# Patient Record
Sex: FEMALE | Race: WHITE | NOT HISPANIC OR LATINO
[De-identification: names, ages, dates, MRNs, and addresses within clinical notes are randomized per-mention and may not be internally consistent; named-entity substitution may affect disease eponyms.]

---

## 2024-04-26 ENCOUNTER — APPOINTMENT (OUTPATIENT)
Dept: ANTEPARTUM | Facility: CLINIC | Age: 19
End: 2024-04-26

## 2024-05-10 ENCOUNTER — ASOB RESULT (OUTPATIENT)
Age: 19
End: 2024-05-10

## 2024-05-10 ENCOUNTER — APPOINTMENT (OUTPATIENT)
Dept: ANTEPARTUM | Facility: CLINIC | Age: 19
End: 2024-05-10
Payer: COMMERCIAL

## 2024-05-10 PROCEDURE — 76811 OB US DETAILED SNGL FETUS: CPT

## 2024-07-08 ENCOUNTER — APPOINTMENT (OUTPATIENT)
Dept: ANTEPARTUM | Facility: CLINIC | Age: 19
End: 2024-07-08

## 2024-07-12 ENCOUNTER — ASOB RESULT (OUTPATIENT)
Age: 19
End: 2024-07-12

## 2024-07-12 ENCOUNTER — APPOINTMENT (OUTPATIENT)
Dept: ANTEPARTUM | Facility: CLINIC | Age: 19
End: 2024-07-12

## 2024-07-12 PROCEDURE — 76817 TRANSVAGINAL US OBSTETRIC: CPT

## 2024-07-12 PROCEDURE — 76816 OB US FOLLOW-UP PER FETUS: CPT

## 2024-08-29 ENCOUNTER — INPATIENT (INPATIENT)
Facility: HOSPITAL | Age: 19
LOS: 2 days | Discharge: HOME | DRG: 951 | End: 2024-09-01
Attending: OBSTETRICS & GYNECOLOGY | Admitting: OBSTETRICS & GYNECOLOGY
Payer: COMMERCIAL

## 2024-08-29 VITALS — SYSTOLIC BLOOD PRESSURE: 113 MMHG | DIASTOLIC BLOOD PRESSURE: 66 MMHG

## 2024-08-29 DIAGNOSIS — O26.899 OTHER SPECIFIED PREGNANCY RELATED CONDITIONS, UNSPECIFIED TRIMESTER: ICD-10-CM

## 2024-08-29 DIAGNOSIS — Z34.80 ENCOUNTER FOR SUPERVISION OF OTHER NORMAL PREGNANCY, UNSPECIFIED TRIMESTER: ICD-10-CM

## 2024-08-29 DIAGNOSIS — O42.90 PREMATURE RUPTURE OF MEMBRANES, UNSPECIFIED AS TO LENGTH OF TIME BETWEEN RUPTURE AND ONSET OF LABOR, UNSPECIFIED WEEKS OF GESTATION: ICD-10-CM

## 2024-08-29 LAB
BASOPHILS # BLD AUTO: 0.02 K/UL — SIGNIFICANT CHANGE UP (ref 0–0.2)
BASOPHILS NFR BLD AUTO: 0.2 % — SIGNIFICANT CHANGE UP (ref 0–2)
BLD GP AB SCN SERPL QL: NEGATIVE — SIGNIFICANT CHANGE UP
EOSINOPHIL # BLD AUTO: 0.03 K/UL — SIGNIFICANT CHANGE UP (ref 0–0.5)
EOSINOPHIL NFR BLD AUTO: 0.4 % — SIGNIFICANT CHANGE UP (ref 0–6)
HCT VFR BLD CALC: 38.9 % — SIGNIFICANT CHANGE UP (ref 34.5–45)
HGB BLD-MCNC: 13 G/DL — SIGNIFICANT CHANGE UP (ref 11.5–15.5)
IMM GRANULOCYTES NFR BLD AUTO: 0.9 % — SIGNIFICANT CHANGE UP (ref 0–0.9)
LYMPHOCYTES # BLD AUTO: 1.95 K/UL — SIGNIFICANT CHANGE UP (ref 1–3.3)
LYMPHOCYTES # BLD AUTO: 23 % — SIGNIFICANT CHANGE UP (ref 13–44)
MCHC RBC-ENTMCNC: 28.4 PG — SIGNIFICANT CHANGE UP (ref 27–34)
MCHC RBC-ENTMCNC: 33.4 GM/DL — SIGNIFICANT CHANGE UP (ref 32–36)
MCV RBC AUTO: 84.9 FL — SIGNIFICANT CHANGE UP (ref 80–100)
MONOCYTES # BLD AUTO: 0.47 K/UL — SIGNIFICANT CHANGE UP (ref 0–0.9)
MONOCYTES NFR BLD AUTO: 5.5 % — SIGNIFICANT CHANGE UP (ref 2–14)
NEUTROPHILS # BLD AUTO: 5.93 K/UL — SIGNIFICANT CHANGE UP (ref 1.8–7.4)
NEUTROPHILS NFR BLD AUTO: 70 % — SIGNIFICANT CHANGE UP (ref 43–77)
NRBC # BLD: 0 /100 WBCS — SIGNIFICANT CHANGE UP (ref 0–0)
PLATELET # BLD AUTO: 160 K/UL — SIGNIFICANT CHANGE UP (ref 150–400)
RBC # BLD: 4.58 M/UL — SIGNIFICANT CHANGE UP (ref 3.8–5.2)
RBC # FLD: 14.7 % — HIGH (ref 10.3–14.5)
RH IG SCN BLD-IMP: NEGATIVE — SIGNIFICANT CHANGE UP
RH IG SCN BLD-IMP: NEGATIVE — SIGNIFICANT CHANGE UP
WBC # BLD: 8.48 K/UL — SIGNIFICANT CHANGE UP (ref 3.8–10.5)
WBC # FLD AUTO: 8.48 K/UL — SIGNIFICANT CHANGE UP (ref 3.8–10.5)

## 2024-08-29 RX ORDER — CHLORHEXIDINE GLUCONATE 40 MG/ML
1 SOLUTION TOPICAL DAILY
Refills: 0 | Status: DISCONTINUED | OUTPATIENT
Start: 2024-08-29 | End: 2024-08-30

## 2024-08-29 RX ORDER — OXYTOCIN 10 UNIT/ML
333.33 AMPUL (ML) INJECTION
Qty: 20 | Refills: 0 | Status: DISCONTINUED | OUTPATIENT
Start: 2024-08-29 | End: 2024-08-30

## 2024-08-29 RX ORDER — SODIUM CITRATE AND CITRIC ACID MONOHYDRATE 334; 500 MG/5ML; MG/5ML
15 SOLUTION ORAL EVERY 6 HOURS
Refills: 0 | Status: DISCONTINUED | OUTPATIENT
Start: 2024-08-29 | End: 2024-08-30

## 2024-08-29 RX ADMIN — Medication 125 MILLILITER(S): at 18:26

## 2024-08-29 NOTE — OB PROVIDER H&P - PROBLEM SELECTOR PLAN 1
Admit  Routine labs  IV access and IV fluids  Bicitra  Cont/efm  For buccal cytotec and cervical 30cc balloon  Anesthesia consult  Expectant .  d/w Dr. Acharya.  KATIE Augustine

## 2024-08-29 NOTE — OB PROVIDER H&P - NSHPPHYSICALEXAM_GEN_ALL_CORE
Vital Signs Last 24 Hrs  T(C): 37.2 (29 Aug 2024 17:21), Max: 37.2 (29 Aug 2024 17:21)  T(F): 99 (29 Aug 2024 17:21), Max: 99 (29 Aug 2024 17:21)  HR: 90 (29 Aug 2024 17:44) (89 - 102)  BP: 113/66 (29 Aug 2024 17:21) (113/66 - 113/66)  BP(mean): --  RR: 18 (29 Aug 2024 17:21) (18 - 18)  SpO2: 93% (29 Aug 2024 17:44) (93% - 96%)    Parameters below as of 29 Aug 2024 17:21  Patient On (Oxygen Delivery Method): room air    gen: NAD  abd: soft, gravid  ve: 0.5/50/-3 posterior/mod tone, grossly ROM Clear fluid    NST: reactive  toco: no ctx

## 2024-08-29 NOTE — OB PROVIDER H&P - NSLOWPPHRISK_OBGYN_A_OB
No previous uterine incision/Umanzor Pregnancy/Less than or equal to 4 previous vaginal births/No known bleeding disorder/No history of postpartum hemorrhage/No other PPH risks indicated

## 2024-08-29 NOTE — OB PROVIDER H&P - HISTORY OF PRESENT ILLNESS
20 y/o  ELSY 24 @ 38.5 wks ga presenting w/ gush of clear fluid at 6:30 am with continued leaking of fluid and cramping 5/10. +FM. Denies vb. GBS neg.  EFW 7lb. PNC uncomplicated. VE yesterday was closed.    all: nkda  meds: pnv    pmhx: denies  ob: current  gyn: denies  surg: denies  fhx: noncontributory  soc: denies x 3.

## 2024-08-29 NOTE — OB RN PATIENT PROFILE - FUNCTIONAL ASSESSMENT - DAILY ACTIVITY SCORE.
Attempted to discuss results with patient's mother, no answer.  Left voicemail and encouraged them to call back to discuss results.  Negative Covid results at this time.   declines 24

## 2024-08-29 NOTE — OB PROVIDER H&P - NS_FETALPRESENTATIONA_OBGYN_ALL_OB
Patient had VSS, denied any chest pain. Patient was SALAZAR on 6L of 02, sats in the high 80's to low 90's. Applied oxymizer on at 10L of O2, sats increased to 96%. Patient's breathing was not labored at that time. LS are diminished. Up to chair for dinner, fair appetite. Up to bathroom several times after IV lasix. Good output. Changed back to NC per patient request. Patient on 6L of O2, sats are 91% at rest. Patient seems comfortable at this time. Continue to monitor.    Cephalic

## 2024-08-30 ENCOUNTER — TRANSCRIPTION ENCOUNTER (OUTPATIENT)
Age: 19
End: 2024-08-30

## 2024-08-30 LAB — T PALLIDUM AB TITR SER: NEGATIVE — SIGNIFICANT CHANGE UP

## 2024-08-30 RX ORDER — OXYTOCIN 10 UNIT/ML
10 AMPUL (ML) INJECTION ONCE
Refills: 0 | Status: COMPLETED | OUTPATIENT
Start: 2024-08-30 | End: 2024-08-30

## 2024-08-30 RX ORDER — SODIUM CHLORIDE 9 MG/ML
3 INJECTION INTRAMUSCULAR; INTRAVENOUS; SUBCUTANEOUS EVERY 8 HOURS
Refills: 0 | Status: DISCONTINUED | OUTPATIENT
Start: 2024-08-30 | End: 2024-09-01

## 2024-08-30 RX ORDER — TERBUTALINE SULFATE 1 MG/ML
0.25 VIAL (ML) SUBCUTANEOUS ONCE
Refills: 0 | Status: DISCONTINUED | OUTPATIENT
Start: 2024-08-30 | End: 2024-08-30

## 2024-08-30 RX ORDER — WITCH HAZEL 1 G/ML
1 LIQUID TOPICAL EVERY 4 HOURS
Refills: 0 | Status: DISCONTINUED | OUTPATIENT
Start: 2024-08-30 | End: 2024-09-01

## 2024-08-30 RX ORDER — OXYTOCIN 10 UNIT/ML
41.67 AMPUL (ML) INJECTION
Qty: 20 | Refills: 0 | Status: DISCONTINUED | OUTPATIENT
Start: 2024-08-30 | End: 2024-09-01

## 2024-08-30 RX ORDER — MENTHOL/CETYLPYRD CL 3 MG
1 LOZENGE MUCOUS MEMBRANE EVERY 6 HOURS
Refills: 0 | Status: DISCONTINUED | OUTPATIENT
Start: 2024-08-30 | End: 2024-09-01

## 2024-08-30 RX ORDER — TETANUS TOXOID, REDUCED DIPHTHERIA TOXOID AND ACELLULAR PERTUSSIS VACCINE, ADSORBED 5; 2.5; 8; 8; 2.5 [IU]/.5ML; [IU]/.5ML; UG/.5ML; UG/.5ML; UG/.5ML
0.5 SUSPENSION INTRAMUSCULAR ONCE
Refills: 0 | Status: DISCONTINUED | OUTPATIENT
Start: 2024-08-30 | End: 2024-09-01

## 2024-08-30 RX ORDER — ACETAMINOPHEN 325 MG/1
975 TABLET ORAL
Refills: 0 | Status: DISCONTINUED | OUTPATIENT
Start: 2024-08-30 | End: 2024-09-01

## 2024-08-30 RX ORDER — OXYCODONE HYDROCHLORIDE 5 MG/1
5 TABLET ORAL
Refills: 0 | Status: DISCONTINUED | OUTPATIENT
Start: 2024-08-30 | End: 2024-09-01

## 2024-08-30 RX ORDER — METHYLERGONOVINE MALEATE 0.2 MG/1
0.2 TABLET ORAL ONCE
Refills: 0 | Status: COMPLETED | OUTPATIENT
Start: 2024-08-30 | End: 2024-08-30

## 2024-08-30 RX ORDER — LANOLIN
1 OINTMENT (GRAM) TOPICAL EVERY 6 HOURS
Refills: 0 | Status: DISCONTINUED | OUTPATIENT
Start: 2024-08-30 | End: 2024-09-01

## 2024-08-30 RX ORDER — KETOROLAC TROMETHAMINE 30 MG/ML
30 INJECTION, SOLUTION INTRAMUSCULAR ONCE
Refills: 0 | Status: DISCONTINUED | OUTPATIENT
Start: 2024-08-30 | End: 2024-08-30

## 2024-08-30 RX ORDER — DIPHENHYDRAMINE HCL 50 MG
25 CAPSULE ORAL EVERY 6 HOURS
Refills: 0 | Status: DISCONTINUED | OUTPATIENT
Start: 2024-08-30 | End: 2024-09-01

## 2024-08-30 RX ORDER — IBUPROFEN 600 MG
600 TABLET ORAL EVERY 6 HOURS
Refills: 0 | Status: COMPLETED | OUTPATIENT
Start: 2024-08-30 | End: 2025-07-29

## 2024-08-30 RX ORDER — HYDROCORTISONE 1 %
1 OINTMENT (GRAM) TOPICAL EVERY 6 HOURS
Refills: 0 | Status: DISCONTINUED | OUTPATIENT
Start: 2024-08-30 | End: 2024-09-01

## 2024-08-30 RX ORDER — PRAMOXINE HCL 1 %
1 GEL (GRAM) TOPICAL EVERY 4 HOURS
Refills: 0 | Status: DISCONTINUED | OUTPATIENT
Start: 2024-08-30 | End: 2024-09-01

## 2024-08-30 RX ORDER — IBUPROFEN 600 MG
600 TABLET ORAL EVERY 6 HOURS
Refills: 0 | Status: DISCONTINUED | OUTPATIENT
Start: 2024-08-30 | End: 2024-09-01

## 2024-08-30 RX ORDER — SODIUM CHLORIDE 9 MG/ML
300 INJECTION INTRAMUSCULAR; INTRAVENOUS; SUBCUTANEOUS ONCE
Refills: 0 | Status: DISCONTINUED | OUTPATIENT
Start: 2024-08-30 | End: 2024-08-30

## 2024-08-30 RX ORDER — VITAMIN A, ASCORBIC ACID, VITAMIN D, .ALPHA.-TOCOPHEROL, THIAMINE MONONITRATE, RIBOFLAVIN, NIACIN, PYRIDOXINE HYDROCHLORIDE, FOLIC ACID, CYANOCOBALAMIN, CALCIUM, IRON, MAGNESIUM, ZINC, AND COPPER 2700; 70; 400; 30; 1.6; 1.8; 18; 2.5; 1; 12; 100; 65; 25; 25; 2 [IU]/1; MG/1; [IU]/1; [IU]/1; MG/1; MG/1; MG/1; MG/1; MG/1; UG/1; MG/1; MG/1; MG/1; MG/1; MG/1
1 TABLET ORAL DAILY
Refills: 0 | Status: DISCONTINUED | OUTPATIENT
Start: 2024-08-30 | End: 2024-09-01

## 2024-08-30 RX ORDER — SODIUM CHLORIDE 9 MG/ML
1000 INJECTION INTRAMUSCULAR; INTRAVENOUS; SUBCUTANEOUS
Refills: 0 | Status: DISCONTINUED | OUTPATIENT
Start: 2024-08-30 | End: 2024-08-30

## 2024-08-30 RX ORDER — OXYCODONE HYDROCHLORIDE 5 MG/1
5 TABLET ORAL ONCE
Refills: 0 | Status: DISCONTINUED | OUTPATIENT
Start: 2024-08-30 | End: 2024-09-01

## 2024-08-30 RX ORDER — OXYTOCIN 10 UNIT/ML
4 AMPUL (ML) INJECTION
Qty: 30 | Refills: 0 | Status: DISCONTINUED | OUTPATIENT
Start: 2024-08-30 | End: 2024-08-30

## 2024-08-30 RX ORDER — ACETAMINOPHEN 325 MG/1
3 TABLET ORAL
Qty: 0 | Refills: 0 | DISCHARGE
Start: 2024-08-30

## 2024-08-30 RX ADMIN — Medication 10 UNIT(S): at 06:51

## 2024-08-30 RX ADMIN — OXYCODONE HYDROCHLORIDE 5 MILLIGRAM(S): 5 TABLET ORAL at 17:34

## 2024-08-30 RX ADMIN — VITAMIN A, ASCORBIC ACID, VITAMIN D, .ALPHA.-TOCOPHEROL, THIAMINE MONONITRATE, RIBOFLAVIN, NIACIN, PYRIDOXINE HYDROCHLORIDE, FOLIC ACID, CYANOCOBALAMIN, CALCIUM, IRON, MAGNESIUM, ZINC, AND COPPER 1 TABLET(S): 2700; 70; 400; 30; 1.6; 1.8; 18; 2.5; 1; 12; 100; 65; 25; 25; 2 TABLET ORAL at 14:10

## 2024-08-30 RX ADMIN — SODIUM CHLORIDE 3 MILLILITER(S): 9 INJECTION INTRAMUSCULAR; INTRAVENOUS; SUBCUTANEOUS at 21:30

## 2024-08-30 RX ADMIN — Medication 4 MILLIUNIT(S)/MIN: at 05:43

## 2024-08-30 RX ADMIN — Medication 600 MILLIGRAM(S): at 14:09

## 2024-08-30 RX ADMIN — KETOROLAC TROMETHAMINE 30 MILLIGRAM(S): 30 INJECTION, SOLUTION INTRAMUSCULAR at 07:43

## 2024-08-30 RX ADMIN — SODIUM CHLORIDE 3 MILLILITER(S): 9 INJECTION INTRAMUSCULAR; INTRAVENOUS; SUBCUTANEOUS at 14:00

## 2024-08-30 RX ADMIN — OXYCODONE HYDROCHLORIDE 5 MILLIGRAM(S): 5 TABLET ORAL at 17:59

## 2024-08-30 RX ADMIN — METHYLERGONOVINE MALEATE 0.2 MILLIGRAM(S): 0.2 TABLET ORAL at 06:56

## 2024-08-30 RX ADMIN — ACETAMINOPHEN 975 MILLIGRAM(S): 325 TABLET ORAL at 11:36

## 2024-08-30 RX ADMIN — ACETAMINOPHEN 975 MILLIGRAM(S): 325 TABLET ORAL at 17:29

## 2024-08-30 RX ADMIN — Medication 600 MILLIGRAM(S): at 20:16

## 2024-08-30 RX ADMIN — ACETAMINOPHEN 975 MILLIGRAM(S): 325 TABLET ORAL at 11:06

## 2024-08-30 RX ADMIN — ACETAMINOPHEN 975 MILLIGRAM(S): 325 TABLET ORAL at 17:59

## 2024-08-30 RX ADMIN — Medication 600 MILLIGRAM(S): at 20:55

## 2024-08-30 RX ADMIN — OXYCODONE HYDROCHLORIDE 5 MILLIGRAM(S): 5 TABLET ORAL at 21:30

## 2024-08-30 RX ADMIN — OXYCODONE HYDROCHLORIDE 5 MILLIGRAM(S): 5 TABLET ORAL at 20:54

## 2024-08-30 RX ADMIN — Medication 220 MILLIGRAM(S): at 06:58

## 2024-08-30 RX ADMIN — Medication 30 MILLILITER(S): at 17:34

## 2024-08-30 RX ADMIN — Medication 600 MILLIGRAM(S): at 14:39

## 2024-08-30 NOTE — OB PROVIDER DELIVERY SUMMARY - NSPROVIDERDELIVERYNOTE_OBGYN_ALL_OB_FT
, pt delivered of a viable male infant apgar 9/9, double nuchal cord, , placenta complete and uterus explored. second degree laceration. ebl 500 cc (initial atony).

## 2024-08-30 NOTE — DISCHARGE NOTE OB - PATIENT PORTAL LINK FT
You can access the FollowMyHealth Patient Portal offered by [facility] by registering at the following website: [email or web address]. By joining [employer]’s FollowMyHealth portal, you will also be able to view your health information using other applications (apps) compatible with our system.

## 2024-08-30 NOTE — OB RN DELIVERY SUMMARY - NSSELHIDDEN_OBGYN_ALL_OB_FT
[[email or web address]_ALL_OB_FT:[email or web address]=],[NS_DeliveryRN_OBGYN_ALL_OB_FT:[id number]=]

## 2024-08-30 NOTE — OB PROVIDER LABOR PROGRESS NOTE - NS_OBIHIFHRDETAILS_OBGYN_ALL_OB_FT
145 / mod manish / -accels / + prolonged 8 min decel
125 / mod manish / -accels /+ prolonged decels, FHR responded well to scalp stimulation

## 2024-08-30 NOTE — OB PROVIDER LABOR PROGRESS NOTE - NS_SUBJECTIVE/OBJECTIVE_OBGYN_ALL_OB_FT
Patient seen and evaluated at bedside for placement of cervical balloon. Procedure explained to patient and patient without questions.     ICU Vital Signs Last 24 Hrs  T(C): 37.2 (29 Aug 2024 17:21), Max: 37.2 (29 Aug 2024 17:21)  T(F): 99 (29 Aug 2024 17:21), Max: 99 (29 Aug 2024 17:21)  HR: 81 (29 Aug 2024 19:33) (81 - 109)  BP: 113/66 (29 Aug 2024 17:21) (113/66 - 113/66)  RR: 18 (29 Aug 2024 17:21) (18 - 18)  SpO2: 97% (29 Aug 2024 19:33) (88% - 100%)    O2 Parameters below as of 29 Aug 2024 17:21  Patient On (Oxygen Delivery Method): room air
Patient seen and evaluated at bedside for placement of IUPC. Procedure explained and patient without questions.    ICU Vital Signs Last 24 Hrs  T(C): 37.2 (30 Aug 2024 03:32), Max: 37.2 (29 Aug 2024 17:21)  T(F): 99 (29 Aug 2024 17:21), Max: 99 (29 Aug 2024 17:21)  HR: 110 (30 Aug 2024 05:15) (76 - 228)  BP: 111/59 (30 Aug 2024 05:14) (110/62 - 135/76)  RR: 18 (30 Aug 2024 03:32) (18 - 18)  SpO2: 98% (30 Aug 2024 05:15) (84% - 100%)    O2 Parameters below as of 29 Aug 2024 17:21  Patient On (Oxygen Delivery Method): room air
Patient seen and evaluated at bedside for increasing discomfort and is requesting her epidural.    ICU Vital Signs Last 24 Hrs  T(C): 37.2 (29 Aug 2024 17:21), Max: 37.2 (29 Aug 2024 17:21)  T(F): 99 (29 Aug 2024 17:21), Max: 99 (29 Aug 2024 17:21)  HR: 124 (30 Aug 2024 03:12) (76 - 228)  BP: 110/69 (30 Aug 2024 00:39) (110/69 - 122/67)  RR: 18 (29 Aug 2024 17:21) (18 - 18)  SpO2: 99% (30 Aug 2024 03:12) (84% - 100%)    O2 Parameters below as of 29 Aug 2024 17:21  Patient On (Oxygen Delivery Method): room air

## 2024-08-30 NOTE — OB PROVIDER LABOR PROGRESS NOTE - ASSESSMENT
20 yo  @38w admitted for IOL 2/ PROM @630am.    - GBS neg  - EFM / TOCO: Resuscitative measures PRN  - IOL with BC and CB, 30cc uterine balloon on traction, patient tolerated procedure well, Dr. Acharya at bedside  - Plan for     DW Dr. Marck Connolly PA-C
20yo  @38w6d admitted for IOL 2/ PROM@630am.    - GBS neg  - EFM / TOCO: Resuscitative measures PRN: Patient examined and balloon was out and in the vagina, FHR began to decel, not resolved by repositioning, patient given terbutaline, fluids wide open, FSE placed and FHR improved and returned to baseline of 135 with mod variability.  - Epidural: Patient is considering epidural at this time, may call anesthesia after FHR tracing improves   - IOL to be continued with Pitocin 4x4  - Expect     DW Dr. Marck [facility]
18yo  @38w6d admitted for IOL 2/ PROM@630am.    - GBS neg  - EFM / TOCO: Resuscitative measures PRN; IUPC and amnio infusion started for recurrent decelerations  - Epidural for pain  - IOL to be continued with Pitocin 4x4 when tracing improves  - Expect     DW Dr. Marck [facility]

## 2024-08-30 NOTE — OB NEONATOLOGY/PEDIATRICIAN DELIVERY SUMMARY - NSPEDSNEONOTESA_OBGYN_ALL_OB_FT
Requested to attend [facility] for non reassuring fetal heart tracing at 38 5/7 weeks gestation. Mother is a 19 year old  with prenatal labs neg, NR and immune, GBS neg, blood type O neg. Pregnancy uncomplicated. SROM 24 hours prior to delivery with clear fluid. Infant emerged cephalic with good tone and cry. Warmed, dried, stimulated and suctioned. Apgars 9/9.

## 2024-08-30 NOTE — DISCHARGE NOTE OB - CARE PROVIDER_API CALL
Kelsie Acharya  Obstetrics and Gynecology  [address] [facility] [address] [facility] [address] [facility] [address]  [place] [address]  Phone: [phone]  Fax: [phone]  Follow Up Time:

## 2024-08-30 NOTE — OB RN DELIVERY SUMMARY - NS_SEPSISRSKCALC_OBGYN_ALL_OB_FT
EOS calculated successfully. EOS Risk Factor: 0.5/1000 live births ([facility] national incidence); GA=38w6d; Temp=99; ROM=24.267; GBS='Negative'; Antibiotics='No antibiotics or any antibiotics < 2 hrs prior to birth'

## 2024-08-30 NOTE — DISCHARGE NOTE OB - NS MD DC FALL RISK RISK
For information on Fall & Injury Prevention, visit: [email or web address] OR  [email or web address] OR  https://www.cdc.gov/steadi/patient.html

## 2024-08-30 NOTE — DISCHARGE NOTE OB - MEDICATION SUMMARY - MEDICATIONS TO TAKE
I will START or STAY ON the medications listed below when I get home from the hospital:     mg oral tablet  -- 1 tab(s) by mouth 4 times a day  -- Do not take this drug if you are pregnant.  It is very important that you take or use this exactly as directed.  Do not skip doses or discontinue unless directed by your doctor.  May cause drowsiness or dizziness.  Obtain medical advice before taking any non-prescription drugs as some may affect the action of this medication.  Take with food or milk.      -- Indication: For cramping    acetaminophen 325 mg oral tablet  -- 3 tab(s) by mouth every 6 hours as needed for  mild pain  -- Indication: For mild pain

## 2024-08-30 NOTE — DISCHARGE NOTE OB - CARE PLAN
1 Principal Discharge DX:	 (normal spontaneous vaginal delivery)  Assessment and plan of treatment:	Return to baseline health, regular diet, pain control. Follow up with Dr. Acharya.

## 2024-08-31 LAB
HCT VFR BLD CALC: 27 % — LOW (ref 34.5–45)
HGB BLD-MCNC: 8.9 G/DL — LOW (ref 11.5–15.5)
KLEIHAUER-BETKE CALCULATION: 0 % — SIGNIFICANT CHANGE UP (ref 0–0.2)
MCHC RBC-ENTMCNC: 28.7 PG — SIGNIFICANT CHANGE UP (ref 27–34)
MCHC RBC-ENTMCNC: 33 GM/DL — SIGNIFICANT CHANGE UP (ref 32–36)
MCV RBC AUTO: 87.1 FL — SIGNIFICANT CHANGE UP (ref 80–100)
NRBC # BLD: 0 /100 WBCS — SIGNIFICANT CHANGE UP (ref 0–0)
PLATELET # BLD AUTO: 130 K/UL — LOW (ref 150–400)
RBC # BLD: 3.1 M/UL — LOW (ref 3.8–5.2)
RBC # FLD: 15.3 % — HIGH (ref 10.3–14.5)
WBC # BLD: 11.68 K/UL — HIGH (ref 3.8–10.5)
WBC # FLD AUTO: 11.68 K/UL — HIGH (ref 3.8–10.5)

## 2024-08-31 RX ADMIN — Medication 600 MILLIGRAM(S): at 03:01

## 2024-08-31 RX ADMIN — Medication 600 MILLIGRAM(S): at 08:11

## 2024-08-31 RX ADMIN — ACETAMINOPHEN 975 MILLIGRAM(S): 325 TABLET ORAL at 18:01

## 2024-08-31 RX ADMIN — ACETAMINOPHEN 975 MILLIGRAM(S): 325 TABLET ORAL at 18:31

## 2024-08-31 RX ADMIN — ACETAMINOPHEN 975 MILLIGRAM(S): 325 TABLET ORAL at 06:22

## 2024-08-31 RX ADMIN — ACETAMINOPHEN 975 MILLIGRAM(S): 325 TABLET ORAL at 23:55

## 2024-08-31 RX ADMIN — Medication 600 MILLIGRAM(S): at 14:07

## 2024-08-31 RX ADMIN — Medication 600 MILLIGRAM(S): at 20:58

## 2024-08-31 RX ADMIN — Medication 600 MILLIGRAM(S): at 21:28

## 2024-08-31 RX ADMIN — ACETAMINOPHEN 975 MILLIGRAM(S): 325 TABLET ORAL at 23:25

## 2024-08-31 RX ADMIN — Medication 600 MILLIGRAM(S): at 08:41

## 2024-08-31 RX ADMIN — Medication 600 MILLIGRAM(S): at 04:00

## 2024-08-31 RX ADMIN — Medication 600 MILLIGRAM(S): at 14:37

## 2024-08-31 RX ADMIN — ACETAMINOPHEN 975 MILLIGRAM(S): 325 TABLET ORAL at 00:24

## 2024-08-31 RX ADMIN — ACETAMINOPHEN 975 MILLIGRAM(S): 325 TABLET ORAL at 06:16

## 2024-08-31 RX ADMIN — ACETAMINOPHEN 975 MILLIGRAM(S): 325 TABLET ORAL at 01:00

## 2024-08-31 NOTE — PROGRESS NOTE ADULT - ASSESSMENT
A/P: 18yo PPD#1 s/p  c/b initial uterine atony ().  Patient is stable and doing well post-partum. VSS, AF.  - Pain well controlled, continue current pain regimen  - Increase ambulation, SCDs when not ambulating  - Continue regular diet  - F/u AM CBC    Afua Michael PGY1 A/P: 20yo PPD#1 s/p  c/b initial uterine atony () s/p IM methergine, cytotecPR, and TXA.  Patient is stable and doing well post-partum. VSS, AF.  - Pain well controlled, continue current pain regimen  - Increase ambulation, SCDs when not ambulating  - Continue regular diet  - F/u AM CBC    Afua Michael PGY1

## 2024-08-31 NOTE — PROGRESS NOTE ADULT - SUBJECTIVE AND OBJECTIVE BOX
OB Progress Note:  PPD#1    S: 20yo  PPD#1 s/p  c/b initial uterine atony (). Patient feels well. Pain is well controlled. She is tolerating a regular diet and passing flatus. She is voiding spontaneously, and ambulating without difficulty. Denies CP/SOB. Denies lightheadedness/dizziness. Denies N/V. Denies fever/chills.    O:  Vitals:  Vital Signs Last 24 Hrs  T(C): 37.1 (30 Aug 2024 21:00), Max: 37.3 (30 Aug 2024 09:13)  T(F): 98.7 (30 Aug 2024 21:00), Max: 99.1 (30 Aug 2024 09:13)  HR: 101 (30 Aug 2024 21:) (85 - 157)  BP: 108/73 (30 Aug 2024 21:) (96/55 - 134/59)  BP(mean): --  RR: 18 (30 Aug 2024 21:00) (16 - 18)  SpO2: 97% (30 Aug 2024 21:00) (91% - 100%)    Parameters below as of 30 Aug 2024 21:00  Patient On (Oxygen Delivery Method): room air        MEDICATIONS  (STANDING):  acetaminophen     Tablet .. 975 milliGRAM(s) Oral <User Schedule>  diphtheria/tetanus/pertussis (acellular) Vaccine (Adacel) 0.5 milliLiter(s) IntraMuscular once  ibuprofen  Tablet. 600 milliGRAM(s) Oral every 6 hours  misoprostol 1000 MICROGram(s) Rectal once  oxytocin Infusion 41.667 milliUNIT(s)/Min (125 mL/Hr) IV Continuous <Continuous>  prenatal multivitamin 1 Tablet(s) Oral daily  sodium chloride 0.9% lock flush 3 milliLiter(s) IV Push every 8 hours      Labs:  Blood type: O Negative  Rubella IgG: RPR: Negative                          13.0   8.48 >-----------< 160    (  @ 18:44 )             38.9                  Physical Exam:  General: NAD  Abdomen: soft, non-tender, non-distended, fundus firm  Vaginal: Lochia wnl  Extremities: No erythema/edema

## 2024-09-01 VITALS
TEMPERATURE: 98 F | HEART RATE: 90 BPM | DIASTOLIC BLOOD PRESSURE: 75 MMHG | RESPIRATION RATE: 18 BRPM | SYSTOLIC BLOOD PRESSURE: 113 MMHG | OXYGEN SATURATION: 96 %

## 2024-09-01 PROCEDURE — 85460 HEMOGLOBIN FETAL: CPT

## 2024-09-01 PROCEDURE — 85027 COMPLETE CBC AUTOMATED: CPT

## 2024-09-01 PROCEDURE — 59050 FETAL MONITOR W/REPORT: CPT

## 2024-09-01 PROCEDURE — 86901 BLOOD TYPING SEROLOGIC RH(D): CPT

## 2024-09-01 PROCEDURE — 85025 COMPLETE CBC W/AUTO DIFF WBC: CPT

## 2024-09-01 PROCEDURE — 86850 RBC ANTIBODY SCREEN: CPT

## 2024-09-01 PROCEDURE — 86780 TREPONEMA PALLIDUM: CPT

## 2024-09-01 PROCEDURE — 86900 BLOOD TYPING SEROLOGIC ABO: CPT

## 2024-09-01 RX ADMIN — Medication 600 MILLIGRAM(S): at 08:21

## 2024-09-01 RX ADMIN — ACETAMINOPHEN 975 MILLIGRAM(S): 325 TABLET ORAL at 05:34

## 2024-09-01 RX ADMIN — ACETAMINOPHEN 975 MILLIGRAM(S): 325 TABLET ORAL at 11:08

## 2024-09-01 RX ADMIN — ACETAMINOPHEN 975 MILLIGRAM(S): 325 TABLET ORAL at 06:04

## 2024-09-01 RX ADMIN — Medication 600 MILLIGRAM(S): at 09:30

## 2024-09-01 RX ADMIN — Medication 600 MILLIGRAM(S): at 03:50

## 2024-09-01 RX ADMIN — Medication 600 MILLIGRAM(S): at 04:20

## 2024-09-09 ENCOUNTER — APPOINTMENT (OUTPATIENT)
Dept: ANTEPARTUM | Facility: CLINIC | Age: 19
End: 2024-09-09

## 2025-05-23 ENCOUNTER — NON-APPOINTMENT (OUTPATIENT)
Age: 20
End: 2025-05-23